# Patient Record
Sex: MALE | Race: WHITE | NOT HISPANIC OR LATINO | Employment: FULL TIME | URBAN - METROPOLITAN AREA
[De-identification: names, ages, dates, MRNs, and addresses within clinical notes are randomized per-mention and may not be internally consistent; named-entity substitution may affect disease eponyms.]

---

## 2018-08-16 DIAGNOSIS — R90.89 ABNORMAL CT OF BRAIN: Primary | ICD-10-CM

## 2018-08-24 ENCOUNTER — HOSPITAL ENCOUNTER (OUTPATIENT)
Dept: RADIOLOGY | Facility: HOSPITAL | Age: 48
Discharge: HOME | End: 2018-08-24
Attending: INTERNAL MEDICINE
Payer: COMMERCIAL

## 2018-08-24 DIAGNOSIS — R90.89 ABNORMAL CT OF BRAIN: ICD-10-CM

## 2018-08-24 PROCEDURE — 70544 MR ANGIOGRAPHY HEAD W/O DYE: CPT | Mod: TC

## 2018-08-24 PROCEDURE — 70544 MR ANGIOGRAPHY HEAD W/O DYE: CPT | Mod: 26,,, | Performed by: RADIOLOGY

## 2018-12-12 ENCOUNTER — TELEPHONE (OUTPATIENT)
Dept: NEUROLOGY | Facility: CLINIC | Age: 48
End: 2018-12-12

## 2018-12-12 NOTE — TELEPHONE ENCOUNTER
----- Message from Hakan Mckeon sent at 12/12/2018  1:51 PM CST -----  Type:  Patient Returning Call    Who Called:  Patient  Who Left Message for Patient:  Ana  Does the patient know what this is regarding?:  An appointment  Best Call Back Number: [phone] (home)

## 2018-12-12 NOTE — TELEPHONE ENCOUNTER
Received medical records and an imaging disc.  Will have imaging uploaded and send the medical records to scanning.  The patient needs an appt with a vascular neurologist at [facility].  Please call him to schedule the appointment at [phone].

## 2018-12-12 NOTE — TELEPHONE ENCOUNTER
----- Message from Matthew Ahuja sent at 12/12/2018 12:52 PM CST -----  Contact: same  Patient called in and stated he missed a call back from Ana in Dr. Reynoso's office about seeing if he can be seen before his scheduled neurology appt at [facility] in April 2019?  Patient call back number is [phone]

## 2018-12-12 NOTE — TELEPHONE ENCOUNTER
Patient is concerned that he was given an appt in April of 2019.  Can the patient wait that long for an appointment?

## 2018-12-12 NOTE — TELEPHONE ENCOUNTER
----- Message from Rita Medina sent at 12/12/2018 12:35 PM CST -----  Patient Requesting Sooner Appointment.     Reason for sooner appt.:due to his condition  When is the first available appointment?4/16  Communication Preference:pt @ [phone]  Additional Information:

## 2018-12-17 NOTE — TELEPHONE ENCOUNTER
----- Message from Zachary Triplett sent at 12/17/2018  2:40 PM CST -----  Patient Returning Call from [facility]    Who Left Message for Patient: Deysi  Communication Preference: [phone]  Additional Information:

## 2018-12-17 NOTE — TELEPHONE ENCOUNTER
----- Message from Rita Medina sent at 12/17/2018  4:15 PM CST -----  Patient Returning Call from [facility]    Who Left Message for Patient:Deysi  Communication Preference:pt@ [phone]  Additional Information:

## 2019-01-31 ENCOUNTER — OFFICE VISIT (OUTPATIENT)
Dept: NEUROLOGY | Facility: CLINIC | Age: 49
End: 2019-01-31
Payer: COMMERCIAL

## 2019-01-31 VITALS
HEART RATE: 74 BPM | HEIGHT: 67 IN | WEIGHT: 158.75 LBS | BODY MASS INDEX: 24.92 KG/M2 | SYSTOLIC BLOOD PRESSURE: 126 MMHG | DIASTOLIC BLOOD PRESSURE: 85 MMHG

## 2019-01-31 DIAGNOSIS — G44.53 HEADACHE, PRIMARY THUNDERCLAP: ICD-10-CM

## 2019-01-31 DIAGNOSIS — M79.18 CERVICAL MYOFASCIAL PAIN SYNDROME: ICD-10-CM

## 2019-01-31 DIAGNOSIS — R20.0 LEFT UPPER EXTREMITY NUMBNESS: ICD-10-CM

## 2019-01-31 DIAGNOSIS — Z86.73 HISTORY OF ISCHEMIC VERTEBROBASILAR ARTERY CEREBELLAR STROKE: Primary | ICD-10-CM

## 2019-01-31 PROCEDURE — 99999 PR PBB SHADOW E&M-EST. PATIENT-LVL III: ICD-10-PCS | Mod: PBBFAC,,, | Performed by: PSYCHIATRY & NEUROLOGY

## 2019-01-31 PROCEDURE — 99245 OFF/OP CONSLTJ NEW/EST HI 55: CPT | Mod: S$GLB,,, | Performed by: PSYCHIATRY & NEUROLOGY

## 2019-01-31 PROCEDURE — 99245 PR OFFICE CONSULTATION,LEVEL V: ICD-10-PCS | Mod: S$GLB,,, | Performed by: PSYCHIATRY & NEUROLOGY

## 2019-01-31 PROCEDURE — 99999 PR PBB SHADOW E&M-EST. PATIENT-LVL III: CPT | Mod: PBBFAC,,, | Performed by: PSYCHIATRY & NEUROLOGY

## 2019-01-31 RX ORDER — METOPROLOL SUCCINATE 100 MG/1
TABLET, EXTENDED RELEASE ORAL
Refills: 2 | COMMUNITY
Start: 2018-11-10

## 2019-01-31 RX ORDER — HYDROCHLOROTHIAZIDE 25 MG/1
TABLET ORAL
Refills: 2 | COMMUNITY
Start: 2018-11-30

## 2019-01-31 RX ORDER — NIFEDIPINE 30 MG/1
TABLET, EXTENDED RELEASE ORAL
Refills: 12 | COMMUNITY
Start: 2019-01-25

## 2019-01-31 RX ORDER — OMEPRAZOLE 20 MG/1
CAPSULE, DELAYED RELEASE ORAL
Refills: 12 | COMMUNITY
Start: 2019-01-25

## 2019-01-31 RX ORDER — ROSUVASTATIN CALCIUM 10 MG/1
TABLET, COATED ORAL
Refills: 2 | COMMUNITY
Start: 2018-12-13

## 2019-01-31 RX ORDER — ASPIRIN 81 MG/1
81 TABLET ORAL DAILY
COMMUNITY

## 2019-01-31 NOTE — PROGRESS NOTES
Outpatient Neurology Consultation    Requesting physician:  Dr. Harris    Impression:  1. 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 Recent L C-O HA;  Suspect cervicogenic but will repeat MRI given location of prior infarct with hemorrhagic transformation  3. Chronic, intermittent LUE numbness, suspect peripheral; r/o radic (vs CTS)  4. Cervical myofascial pain  5. History of Bell's Palsy    Stroke risk factors: prior stroke, HTN, dyslipidemia    Plan:  1. MRI brain  2. Exercise encouraged  3. Smoking cessation discussed  4. Continue ASA, Crestor  5. Home cervical exercises; Cervical PT prn  6. EMG/NCV LUE  7. RTC 3 months or sooner, prn      Problem List Items Addressed This Visit        1 - High    History of ischemic vertebrobasilar artery cerebellar stroke - Primary    Overview     L PICA, ESUS         Relevant Orders    MRI Brain Without Contrast       2     Left upper extremity numbness    Relevant Orders    EMG W/ ULTRASOUND AND NERVE CONDUCTION TEST 1 Extremity       3     Cervical myofascial pain syndrome      Other Visit Diagnoses     Headache, primary thunderclap        Relevant Orders    MRI Brain Without Contrast          CC:  Stroke and HA    HPI:  49 y/o M referred by Dr. Harris for remote stroke and recent headache.  Admitted Aug '17 for ischemic stroke.  He had MRI in Jan '18 for vertigo.  MRI shows mixed (acute on subacute) signal ischemic stroke with hemorrhagic transformation.  F/U MRI Aug '18 showed L PICA infarct.  MRA brain neg.   Outside CTA neck/brain nl per report  Echo: nl  Thrombophilia eval: neg    He reports severe HAs last week (L occipital) that have resolved.  Pounding but no nausea, photo/phonophobia.  There was associated neck pain/stiffness; no fever.     Past Medical History:   Diagnosis Date    Dyslipidemia     HTN (hypertension)     Stroke       Past Surgical History:   Procedure Laterality Date    VASECTOMY        Outpatient Medications Marked  as Taking for the 1/31/19 encounter (Office Visit) with Anival Doyle MD   Medication Sig Dispense Refill    aspirin (ECOTRIN) 81 MG EC tablet Take 81 mg by mouth once daily.      hydroCHLOROthiazide (HYDRODIURIL) 25 MG tablet TK 1 T PO ONCE QAM FOR BP.  2    metoprolol succinate (TOPROL-XL) 100 MG 24 hr tablet   2    NIFEdipine (PROCARDIA-XL) 30 MG (OSM) 24 hr tablet TK 1 T PO QD ATN  12    omeprazole (PRILOSEC) 20 MG capsule TK 1 C PO QD PRN  12    rosuvastatin (CRESTOR) 10 MG tablet TAKE 1 TABLET BY MOUTH ONCE IN THE EVENING FOR CHOLESTEROL  2      Review of patient's allergies indicates:  Allergies not on file   History reviewed. No pertinent family history.   Social History     Socioeconomic History    Marital status: Single     Spouse name: Not on file    Number of children: Not on file    Years of education: Not on file    Highest education level: Not on file   Social Needs    Financial resource strain: Not on file    Food insecurity - worry: Not on file    Food insecurity - inability: Not on file    Transportation needs - medical: Not on file    Transportation needs - non-medical: Not on file   Occupational History    Not on file   Tobacco Use    Smoking status: Heavy Tobacco Smoker     Packs/day: 0.50     Types: Cigarettes    Smokeless tobacco: Current User   Substance and Sexual Activity    Alcohol use: Yes     Comment: barely drinks    Drug use: Yes     Types: Marijuana     Comment: once in a while    Sexual activity: Not on file   Other Topics Concern    Not on file   Social History Narrative    Not on file     Review Of Systems:  General: Negative for fever   HENT: Negative for tinnitus, nose bleeds, neck stiffness   Cardiac Negative for palpitations   Vascular: Negative for easy bruising   Pulmonary: Negative for cough   Gastrointestinal: Negative for constipation   Urinary: Negative for incomplete bladder emptying   Musculoskeletal: Negative for muscle aches  "  Neurological: As above. Otherwise negative for abnormal movements   Psychiatric:  Negative for depression       /85   Pulse 74   Ht 5' 7" (1.702 m)   Wt 72 kg (158 lb 11.7 oz)   BMI 24.86 kg/m²    Well developed, well nourished male  Extremities: no edema; neg Tinel's/Phalan's  Neck: L cervical paraspinal spasm/pain with decreased R lateral flexion.    Mental status:   Awake, alert and appropriately oriented   Normal recent and remote memory   Normal attention and concentration   Normal speech and language   Normal fund of knowledge   No extinction  Cranial nerves:   EOMF without nystagmus   VFF   Normal facial sensation   Flat R NLF   Intact hearing bilaterally   Palate elevates symmetrically   Normal SCM and trapezius strength   Tongue midline  Motor:   No pronator drift   Normal FF movements bilaterally   Normal muscle tone, bulk and power   No abnormal movements  Sensory   Intact to LT, temperature  DTRs   2+ and symmetric except trace AJs   Plantar responses NT  Coordination   Intact to FNF and HTS  Gait   Normal base and gait    Data Reviewed:  Prior MRIs, MRA brain  Outside records    Anival Doyle MD  "

## 2019-02-07 ENCOUNTER — TELEPHONE (OUTPATIENT)
Dept: NEUROLOGY | Facility: CLINIC | Age: 49
End: 2019-02-07

## 2019-02-07 ENCOUNTER — HOSPITAL ENCOUNTER (OUTPATIENT)
Dept: RADIOLOGY | Facility: HOSPITAL | Age: 49
Discharge: HOME | End: 2019-02-07
Attending: PSYCHIATRY & NEUROLOGY
Payer: COMMERCIAL

## 2019-02-07 DIAGNOSIS — Z86.73 HISTORY OF ISCHEMIC VERTEBROBASILAR ARTERY CEREBELLAR STROKE: ICD-10-CM

## 2019-02-07 DIAGNOSIS — G44.53 HEADACHE, PRIMARY THUNDERCLAP: ICD-10-CM

## 2019-02-07 PROCEDURE — 70551 MRI BRAIN STEM W/O DYE: CPT | Mod: TC

## 2019-02-07 PROCEDURE — 70551 MRI BRAIN STEM W/O DYE: CPT | Mod: 26,,, | Performed by: RADIOLOGY

## 2019-02-07 PROCEDURE — 70551 MRI BRAIN WITHOUT CONTRAST: ICD-10-PCS | Mod: 26,,, | Performed by: RADIOLOGY

## 2019-02-08 NOTE — TELEPHONE ENCOUNTER
Deysi,  Pls let him know the MRI shows nothing new (just the old stroke).      Also, ask if he would like to sign up for [facility].  Thanks,  RANDRE

## 2019-02-11 ENCOUNTER — TELEPHONE (OUTPATIENT)
Dept: NEUROLOGY | Facility: CLINIC | Age: 49
End: 2019-02-11

## 2019-02-11 NOTE — TELEPHONE ENCOUNTER
----- Message from Ashwin Killian sent at 2/11/2019  3:05 PM CST -----  Contact: Patient @ [phone]  Patient returning a missed call from luna Jo return call

## 2019-02-11 NOTE — TELEPHONE ENCOUNTER
Left message on Mr. Mendoza's voicemail requesting a call back.   --------------------------------------------------------------------------    Progress Notes     Anival Doyle MD at 2/7/2019  8:29 PM     Status: Signed      Deysi,  Pls let him know the MRI shows nothing new (just the old stroke).       Also, ask if he would like to sign up for [facility].  Thanks,  PIERRE          [id number]  Expires: 2/15/2019 4:59 AM

## 2019-03-19 ENCOUNTER — PROCEDURE VISIT (OUTPATIENT)
Dept: NEUROLOGY | Facility: CLINIC | Age: 49
End: 2019-03-19
Payer: COMMERCIAL

## 2019-03-19 DIAGNOSIS — R20.0 LEFT UPPER EXTREMITY NUMBNESS: ICD-10-CM

## 2019-03-19 PROCEDURE — 95910 PR NERVE CONDUCTION STUDY; 7-8 STUDIES: ICD-10-PCS | Mod: S$GLB,,, | Performed by: NEUROMUSCULOSKELETAL MEDICINE & OMM

## 2019-03-19 PROCEDURE — 95886 PR EMG COMPLETE, W/ NERVE CONDUCTION STUDIES, 5+ MUSCLES: ICD-10-PCS | Mod: S$GLB,,, | Performed by: NEUROMUSCULOSKELETAL MEDICINE & OMM

## 2019-03-19 PROCEDURE — 95886 MUSC TEST DONE W/N TEST COMP: CPT | Mod: S$GLB,,, | Performed by: NEUROMUSCULOSKELETAL MEDICINE & OMM

## 2019-03-19 PROCEDURE — 95910 NRV CNDJ TEST 7-8 STUDIES: CPT | Mod: S$GLB,,, | Performed by: NEUROMUSCULOSKELETAL MEDICINE & OMM

## 2019-03-19 NOTE — PROCEDURES
EMG Needle exam performed by me.  Nerve conduction studies were also performed by me without the assistance of the technicianEMG Needle exam performed by me.  Nerve conduction studies were also performed by me without the assistance of the technician

## 2019-04-04 ENCOUNTER — OFFICE VISIT (OUTPATIENT)
Dept: NEUROLOGY | Facility: CLINIC | Age: 49
End: 2019-04-04
Payer: COMMERCIAL

## 2019-04-04 VITALS
SYSTOLIC BLOOD PRESSURE: 134 MMHG | WEIGHT: 160.25 LBS | BODY MASS INDEX: 25.15 KG/M2 | HEIGHT: 67 IN | HEART RATE: 65 BPM | DIASTOLIC BLOOD PRESSURE: 92 MMHG

## 2019-04-04 DIAGNOSIS — Z86.73 HISTORY OF ISCHEMIC VERTEBROBASILAR ARTERY CEREBELLAR STROKE: ICD-10-CM

## 2019-04-04 DIAGNOSIS — M79.18 CERVICAL MYOFASCIAL PAIN SYNDROME: Primary | ICD-10-CM

## 2019-04-04 DIAGNOSIS — R20.0 LEFT UPPER EXTREMITY NUMBNESS: ICD-10-CM

## 2019-04-04 PROCEDURE — 3008F BODY MASS INDEX DOCD: CPT | Mod: CPTII,S$GLB,, | Performed by: PSYCHIATRY & NEUROLOGY

## 2019-04-04 PROCEDURE — 99214 PR OFFICE/OUTPT VISIT, EST, LEVL IV, 30-39 MIN: ICD-10-PCS | Mod: S$GLB,,, | Performed by: PSYCHIATRY & NEUROLOGY

## 2019-04-04 PROCEDURE — 3008F PR BODY MASS INDEX (BMI) DOCUMENTED: ICD-10-PCS | Mod: CPTII,S$GLB,, | Performed by: PSYCHIATRY & NEUROLOGY

## 2019-04-04 PROCEDURE — 99999 PR PBB SHADOW E&M-EST. PATIENT-LVL III: CPT | Mod: PBBFAC,,, | Performed by: PSYCHIATRY & NEUROLOGY

## 2019-04-04 PROCEDURE — 99214 OFFICE O/P EST MOD 30 MIN: CPT | Mod: S$GLB,,, | Performed by: PSYCHIATRY & NEUROLOGY

## 2019-04-04 PROCEDURE — 99999 PR PBB SHADOW E&M-EST. PATIENT-LVL III: ICD-10-PCS | Mod: PBBFAC,,, | Performed by: PSYCHIATRY & NEUROLOGY

## 2019-04-04 NOTE — PROGRESS NOTES
"Neurology Clinic Follow-Up Note      Impression:  1. 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 Cervicogenic headaches due to cervical myofascial pain  3. Chronic, intermittent LUE numbness, improved: suspect EMG/NCV-negative radic vs CTS  4. History of Bell's Palsy    Stroke risk factors: prior stroke, HTN, dyslipidemia    Plan:  1. Cervical PT order provided  2. Exercise encouraged  3. Low Na+ diet discusssed  4. Smoking cessation discussed again  5. Continue ASA, Crestor  6. RTC 3 months or sooner, prn      Problem List Items Addressed This Visit        1 - High    History of ischemic vertebrobasilar artery cerebellar stroke    Overview     L PICA, ESUS            2     Left upper extremity numbness       3     Cervical myofascial pain syndrome - Primary    Relevant Orders    Ambulatory consult to Physical Therapy        Patient returns for follow-up of above.  No recurrent stroke symptoms.  MRI brain showed remote L cerebellar stroke  EMG/NCV was normal. Has intermittent LUE sensory symptoms but not too bothersome  Headaches and neck pain had improved but recur every few weeks.  Still smoking  Salting food      Past Medical History:   Diagnosis Date    Dyslipidemia     HTN (hypertension)     Stroke          Outpatient Medications Marked as Taking for the 4/4/19 encounter (Office Visit) with Anival Doyle MD   Medication Sig Dispense Refill    aspirin (ECOTRIN) 81 MG EC tablet Take 81 mg by mouth once daily.      hydroCHLOROthiazide (HYDRODIURIL) 25 MG tablet TK 1 T PO ONCE QAM FOR BP.  2    metoprolol succinate (TOPROL-XL) 100 MG 24 hr tablet   2    NIFEdipine (PROCARDIA-XL) 30 MG (OSM) 24 hr tablet TK 1 T PO QD ATN  12    omeprazole (PRILOSEC) 20 MG capsule TK 1 C PO QD PRN  12    rosuvastatin (CRESTOR) 10 MG tablet TAKE 1 TABLET BY MOUTH ONCE IN THE EVENING FOR CHOLESTEROL  2       BP (!) 134/92   Pulse 65   Ht 5' 7" (1.702 m)   Wt 72.7 kg (160 lb 4.4 oz)   " BMI 25.10 kg/m²   Well-developed, well nourished.  Awake, alert and oriented.  Language is normal.  EOMF without nystagmus, VFF, facial movements intact.  No UE drift.  No extinction to double simultaneous tactile stimulation.  Muscle power is normal.  Coordination intact to FNF.  Gait is steady.    Anival Doyle MD

## 2024-04-26 ENCOUNTER — HOSPITAL ENCOUNTER (OUTPATIENT)
Dept: RADIOLOGY | Facility: HOSPITAL | Age: 54
Discharge: HOME | End: 2024-04-26
Attending: INTERNAL MEDICINE
Payer: COMMERCIAL

## 2024-04-26 DIAGNOSIS — R05.2 SUBACUTE COUGH: ICD-10-CM

## 2024-04-26 PROCEDURE — 71046 X-RAY EXAM CHEST 2 VIEWS: CPT | Mod: TC

## 2024-04-27 ENCOUNTER — LAB VISIT (OUTPATIENT)
Dept: LAB | Facility: HOSPITAL | Age: 54
End: 2024-04-27
Attending: INTERNAL MEDICINE
Payer: COMMERCIAL

## 2024-04-27 DIAGNOSIS — Z00.00 ROUTINE MEDICAL EXAM: ICD-10-CM

## 2024-04-27 LAB
25(OH)D3+25(OH)D2 SERPL-MCNC: 36 NG/ML (ref 30–96)
ALBUMIN SERPL BCP-MCNC: 3.8 G/DL (ref 3.5–5.2)
ALP SERPL-CCNC: 103 U/L (ref 55–135)
ALT SERPL W/O P-5'-P-CCNC: 31 U/L (ref 10–44)
ANION GAP SERPL CALC-SCNC: 3 MMOL/L (ref 3–11)
AST SERPL-CCNC: 16 U/L (ref 10–40)
BASOPHILS # BLD AUTO: 0.06 K/UL (ref 0–0.2)
BASOPHILS NFR BLD: 0.6 % (ref 0–1.9)
BILIRUB SERPL-MCNC: 0.3 MG/DL (ref 0.1–1)
BUN SERPL-MCNC: 18 MG/DL (ref 6–20)
CALCIUM SERPL-MCNC: 8.5 MG/DL (ref 8.7–10.5)
CHLORIDE SERPL-SCNC: 106 MMOL/L (ref 95–110)
CHOLEST SERPL-MCNC: 136 MG/DL (ref 120–199)
CHOLEST/HDLC SERPL: 4.4 {RATIO} (ref 2–5)
CO2 SERPL-SCNC: 29 MMOL/L (ref 23–29)
COMPLEXED PSA SERPL-MCNC: 0.91 NG/ML (ref 0–4)
CREAT SERPL-MCNC: 1.1 MG/DL (ref 0.5–1.4)
DIFFERENTIAL METHOD BLD: ABNORMAL
EOSINOPHIL # BLD AUTO: 0.5 K/UL (ref 0–0.5)
EOSINOPHIL NFR BLD: 4.3 % (ref 0–8)
ERYTHROCYTE [DISTWIDTH] IN BLOOD BY AUTOMATED COUNT: 16.5 % (ref 11.5–14.5)
EST. GFR  (NO RACE VARIABLE): >60 ML/MIN/1.73 M^2
ESTIMATED AVG GLUCOSE: 128 MG/DL (ref 68–131)
GLUCOSE SERPL-MCNC: 111 MG/DL (ref 70–110)
HBA1C MFR BLD: 6.1 % (ref 4–5.6)
HCT VFR BLD AUTO: 34.3 % (ref 40–54)
HCV AB SERPL QL IA: NORMAL
HDLC SERPL-MCNC: 31 MG/DL (ref 40–75)
HDLC SERPL: 22.8 % (ref 20–50)
HGB BLD-MCNC: 10 G/DL (ref 14–18)
HIV 1+2 AB+HIV1 P24 AG SERPL QL IA: NORMAL
IMM GRANULOCYTES # BLD AUTO: 0.03 K/UL (ref 0–0.04)
IMM GRANULOCYTES NFR BLD AUTO: 0.3 % (ref 0–0.5)
LDLC SERPL CALC-MCNC: 56.6 MG/DL (ref 63–159)
LYMPHOCYTES # BLD AUTO: 2.4 K/UL (ref 1–4.8)
LYMPHOCYTES NFR BLD: 22.8 % (ref 18–48)
MCH RBC QN AUTO: 20.2 PG (ref 27–31)
MCHC RBC AUTO-ENTMCNC: 29.2 G/DL (ref 32–36)
MCV RBC AUTO: 69 FL (ref 82–98)
MONOCYTES # BLD AUTO: 0.9 K/UL (ref 0.3–1)
MONOCYTES NFR BLD: 8.6 % (ref 4–15)
NEUTROPHILS # BLD AUTO: 6.7 K/UL (ref 1.8–7.7)
NEUTROPHILS NFR BLD: 63.4 % (ref 38–73)
NONHDLC SERPL-MCNC: 105 MG/DL
NRBC BLD-RTO: 0 /100 WBC
PLATELET # BLD AUTO: 368 K/UL (ref 150–450)
PMV BLD AUTO: 8.6 FL (ref 9.2–12.9)
POTASSIUM SERPL-SCNC: 4.1 MMOL/L (ref 3.5–5.1)
PROT SERPL-MCNC: 7.6 G/DL (ref 6–8.4)
RBC # BLD AUTO: 4.94 M/UL (ref 4.6–6.2)
SODIUM SERPL-SCNC: 138 MMOL/L (ref 136–145)
TRIGL SERPL-MCNC: 242 MG/DL (ref 30–150)
TSH SERPL DL<=0.005 MIU/L-ACNC: 2.51 UIU/ML (ref 0.4–4)
WBC # BLD AUTO: 10.56 K/UL (ref 3.9–12.7)

## 2024-04-27 PROCEDURE — 80061 LIPID PANEL: CPT | Performed by: INTERNAL MEDICINE

## 2024-04-27 PROCEDURE — 83036 HEMOGLOBIN GLYCOSYLATED A1C: CPT | Performed by: INTERNAL MEDICINE

## 2024-04-27 PROCEDURE — 84443 ASSAY THYROID STIM HORMONE: CPT | Performed by: INTERNAL MEDICINE

## 2024-04-27 PROCEDURE — 84153 ASSAY OF PSA TOTAL: CPT | Performed by: INTERNAL MEDICINE

## 2024-04-27 PROCEDURE — 36415 COLL VENOUS BLD VENIPUNCTURE: CPT | Performed by: INTERNAL MEDICINE

## 2024-04-27 PROCEDURE — 85025 COMPLETE CBC W/AUTO DIFF WBC: CPT | Performed by: INTERNAL MEDICINE

## 2024-04-27 PROCEDURE — 82306 VITAMIN D 25 HYDROXY: CPT | Performed by: INTERNAL MEDICINE

## 2024-04-27 PROCEDURE — 86803 HEPATITIS C AB TEST: CPT | Performed by: INTERNAL MEDICINE

## 2024-04-27 PROCEDURE — 87389 HIV-1 AG W/HIV-1&-2 AB AG IA: CPT | Performed by: INTERNAL MEDICINE

## 2024-04-27 PROCEDURE — 80053 COMPREHEN METABOLIC PANEL: CPT | Performed by: INTERNAL MEDICINE

## 2024-05-02 ENCOUNTER — LAB VISIT (OUTPATIENT)
Dept: LAB | Facility: HOSPITAL | Age: 54
End: 2024-05-02
Attending: INTERNAL MEDICINE
Payer: COMMERCIAL

## 2024-05-02 DIAGNOSIS — Z00.00 ROUTINE MEDICAL EXAM: ICD-10-CM

## 2024-05-02 LAB
OB PNL STL: POSITIVE
OB PNL STL: POSITIVE

## 2024-05-02 PROCEDURE — 82272 OCCULT BLD FECES 1-3 TESTS: CPT | Mod: 91 | Performed by: INTERNAL MEDICINE

## 2024-05-02 NOTE — PROGRESS NOTES
Patient stool is positive for blood, make sure he is getting referred to GI or General Surg for evaluation and endoscopy.

## 2024-05-17 PROBLEM — D50.0 IRON DEFICIENCY ANEMIA DUE TO CHRONIC BLOOD LOSS: Status: ACTIVE | Noted: 2024-05-17

## 2024-05-17 PROBLEM — J30.9 ALLERGIC RHINITIS: Status: ACTIVE | Noted: 2024-05-17

## 2024-05-17 PROBLEM — R19.5 HEME + STOOL: Status: ACTIVE | Noted: 2024-05-17

## 2024-07-23 ENCOUNTER — TELEPHONE (OUTPATIENT)
Dept: SURGERY | Facility: CLINIC | Age: 54
End: 2024-07-23
Payer: COMMERCIAL

## 2024-07-23 DIAGNOSIS — Z86.010 HX OF COLONIC POLYPS: Primary | ICD-10-CM

## 2024-07-23 NOTE — TELEPHONE ENCOUNTER
RN called pt to let him know that Dr. Koch reviewed his chart and would like to get him set up for a c-scope at [facility].  RN messaged Nurse Lynn and placed case request.  RN left pt a voicemail with our contact information.

## 2024-08-06 DIAGNOSIS — Z01.818 PREOP TESTING: Primary | ICD-10-CM

## 2024-08-06 RX ORDER — SODIUM PICOSULFATE, MAGNESIUM OXIDE, AND ANHYDROUS CITRIC ACID 12; 3.5; 1 G/175ML; G/175ML; MG/175ML
2 LIQUID ORAL ONCE
Qty: 350 ML | Refills: 0 | Status: SHIPPED | OUTPATIENT
Start: 2024-08-14 | End: 2024-08-14

## 2024-08-06 RX ORDER — BISACODYL 5 MG
20 TABLET, DELAYED RELEASE (ENTERIC COATED) ORAL ONCE
Qty: 4 TABLET | Refills: 0 | Status: SHIPPED | OUTPATIENT
Start: 2024-08-14 | End: 2024-08-07 | Stop reason: CLARIF

## 2024-08-07 ENCOUNTER — HOSPITAL ENCOUNTER (OUTPATIENT)
Dept: PREADMISSION TESTING | Facility: HOSPITAL | Age: 54
Discharge: HOME | End: 2024-08-07
Attending: STUDENT IN AN ORGANIZED HEALTH CARE EDUCATION/TRAINING PROGRAM
Payer: COMMERCIAL

## 2024-08-15 ENCOUNTER — ANESTHESIA (OUTPATIENT)
Dept: ENDOSCOPY | Facility: HOSPITAL | Age: 54
End: 2024-08-15
Payer: COMMERCIAL

## 2024-08-15 ENCOUNTER — HOSPITAL ENCOUNTER (OUTPATIENT)
Facility: HOSPITAL | Age: 54
Discharge: HOME | End: 2024-08-15
Attending: STUDENT IN AN ORGANIZED HEALTH CARE EDUCATION/TRAINING PROGRAM | Admitting: STUDENT IN AN ORGANIZED HEALTH CARE EDUCATION/TRAINING PROGRAM
Payer: COMMERCIAL

## 2024-08-15 ENCOUNTER — ANESTHESIA EVENT (OUTPATIENT)
Dept: ENDOSCOPY | Facility: HOSPITAL | Age: 54
End: 2024-08-15
Payer: COMMERCIAL

## 2024-08-15 VITALS
TEMPERATURE: 97 F | OXYGEN SATURATION: 99 % | SYSTOLIC BLOOD PRESSURE: 157 MMHG | RESPIRATION RATE: 15 BRPM | DIASTOLIC BLOOD PRESSURE: 90 MMHG | HEART RATE: 60 BPM

## 2024-08-15 DIAGNOSIS — D12.5 ADENOMATOUS POLYP OF SIGMOID COLON: Primary | ICD-10-CM

## 2024-08-15 PROCEDURE — 37000008 HC ANESTHESIA 1ST 15 MINUTES: Performed by: STUDENT IN AN ORGANIZED HEALTH CARE EDUCATION/TRAINING PROGRAM

## 2024-08-15 PROCEDURE — 63600175 PHARM REV CODE 636 W HCPCS: Performed by: NURSE ANESTHETIST, CERTIFIED REGISTERED

## 2024-08-15 PROCEDURE — 25000003 PHARM REV CODE 250: Performed by: NURSE ANESTHETIST, CERTIFIED REGISTERED

## 2024-08-15 PROCEDURE — 45385 COLONOSCOPY W/LESION REMOVAL: CPT | Mod: ,,, | Performed by: STUDENT IN AN ORGANIZED HEALTH CARE EDUCATION/TRAINING PROGRAM

## 2024-08-15 PROCEDURE — 45385 COLONOSCOPY W/LESION REMOVAL: CPT | Performed by: STUDENT IN AN ORGANIZED HEALTH CARE EDUCATION/TRAINING PROGRAM

## 2024-08-15 PROCEDURE — 27201089 HC SNARE, DISP (ANY): Performed by: STUDENT IN AN ORGANIZED HEALTH CARE EDUCATION/TRAINING PROGRAM

## 2024-08-15 PROCEDURE — 37000009 HC ANESTHESIA EA ADD 15 MINS: Performed by: STUDENT IN AN ORGANIZED HEALTH CARE EDUCATION/TRAINING PROGRAM

## 2024-08-15 RX ORDER — LIDOCAINE HYDROCHLORIDE 20 MG/ML
INJECTION, SOLUTION EPIDURAL; INFILTRATION; INTRACAUDAL; PERINEURAL
Status: DISCONTINUED | OUTPATIENT
Start: 2024-08-15 | End: 2024-08-15

## 2024-08-15 RX ORDER — PROPOFOL 10 MG/ML
VIAL (ML) INTRAVENOUS
Status: DISCONTINUED | OUTPATIENT
Start: 2024-08-15 | End: 2024-08-15

## 2024-08-15 RX ADMIN — LIDOCAINE HYDROCHLORIDE 5 ML: 20 INJECTION, SOLUTION EPIDURAL; INFILTRATION; INTRACAUDAL; PERINEURAL at 10:08

## 2024-08-15 RX ADMIN — PROPOFOL 50 MG: 10 INJECTION, EMULSION INTRAVENOUS at 11:08

## 2024-08-15 RX ADMIN — PROPOFOL 50 MG: 10 INJECTION, EMULSION INTRAVENOUS at 10:08

## 2024-08-15 RX ADMIN — PROPOFOL 100 MG: 10 INJECTION, EMULSION INTRAVENOUS at 10:08

## 2024-08-15 NOTE — H&P
[facility]  Colon and Rectal Surgery    [address]  [place], [place]  Tel: [phone]  Fax: [phone]  [email or web address]   MD Myron Norris MD Brian Kann, MD W. Forrest Johnston, MD Matthew Giglia, MD Jennifer Paruch, MD William Kethman, MD Danielle Kay, MD     Patient name: Reji Doss   YOB: 1970   MRN: [id number]  Date of procedure: 08/15/2024    Procedure: Colonoscopy  Indications: Recent colonoscopy with unresected sigmoid colon polyp    The patient was informed of the availability of a certified  without charge. A certified  was not necessary for this visit.    Sedation plan: MAC  ASA: ASA 2 - Patient with mild systemic disease with no functional limitations    Review of Systems  See above    Past Medical History:   Diagnosis Date    Dyslipidemia     GERD (gastroesophageal reflux disease)     HTN (hypertension)     Hyperlipidemia     Stroke      Past Surgical History:   Procedure Laterality Date    COLONOSCOPY  07/03/2024    VASECTOMY       Family History   Problem Relation Name Age of Onset    Hypertension Mother       Social History     Tobacco Use    Smoking status: Former     Current packs/day: 0.25     Average packs/day: 0.3 packs/day for 15.0 years (3.8 ttl pk-yrs)     Types: Cigarettes, Vaping with nicotine    Smokeless tobacco: Current    Tobacco comments:     Former cigarette smoker; currently vapes with nicotine   Substance Use Topics    Alcohol use: Never     Comment: barely drinks    Drug use: Yes     Types: Marijuana     Comment: once in a while     Review of patient's allergies indicates:  No Known Allergies    Prior to Admission medications    Medication Sig Start Date End Date Taking? Authorizing Provider   ascorbic acid, vitamin C, (VITAMIN C) 500 MG tablet Take 1 tablet (500 mg total) by mouth once daily. 5/3/24   Vinay Brennan III, MD   aspirin (ECOTRIN) 81 MG EC  tablet Take 81 mg by mouth once daily.    Provider, Historical   cefdinir (OMNICEF) 300 MG capsule Take 300 mg by mouth 2 (two) times daily. 5/16/24   Provider, Historical   cetirizine (ZYRTEC) 10 MG tablet Take 10 mg by mouth. 5/2/24   Provider, Historical   ferrous sulfate (FEOSOL) 325 mg (65 mg iron) Tab tablet TAKE 1 TABLET BY MOUTH ONCE DAILY. 5/13/24   Vinay Brennan III, MD   fluticasone propionate (FLONASE) 50 mcg/actuation nasal spray 2 sprays by Each Nostril route. 5/2/24   Provider, Historical   hydroCHLOROthiazide (HYDRODIURIL) 25 MG tablet TK 1 T PO ONCE QAM FOR BP. 11/30/18   Provider, Historical   methylPREDNISolone (MEDROL DOSEPACK) 4 mg tablet Take by mouth. 5/16/24   Provider, Historical   metoprolol succinate (TOPROL-XL) 100 MG 24 hr tablet  11/10/18   Provider, Historical   montelukast (SINGULAIR) 10 mg tablet Take 10 mg by mouth. 5/2/24   Provider, Historical   NIFEdipine (PROCARDIA-XL) 30 MG (OSM) 24 hr tablet TK 1 T PO QD ATN 1/25/19   Provider, Historical   omeprazole (PRILOSEC) 20 MG capsule TK 1 C PO QD PRN 1/25/19   Provider, Historical   rosuvastatin (CRESTOR) 10 MG tablet TAKE 1 TABLET BY MOUTH ONCE IN THE EVENING FOR CHOLESTEROL 12/13/18   Provider, Historical   sod picosulf-mag ox-citric ac (CLENPIQ) 10 mg-3.5 gram- 12 gram/175 mL Soln Take 2 Bottles by mouth once. for 1 dose 8/14/24 8/14/24  Nilson Koch MD       Physical Examination  /84 (BP Location: Left arm, Patient Position: Lying)   Pulse 61   Temp 97.4 °F (36.3 °C) (Skin)   Resp 18   SpO2 99%      Constitutional: well developed, no cough, no dyspnea, alert, and no acute distress    Head: Normocephalic, no lesions, without obvious abnormality  Eye: Normal external eye, conjunctiva, and lids, PERRL  Cardiovascular: regular rate and regular rhythm  Respiratory: normal air entry  Gastrointestinal: soft, non-tender, without masses or organomegaly  Neurologic: alert, oriented, normal speech, no focal findings or  movement disorder noted  Psychiatric: appropriate, normal mood    Plan of Care    It was a pleasure meeting Mr. Doss today - we will plan to perform a colonoscopy with monitored anesthesia care. The details of the procedure, the possible need for biopsy or polypectomy and the potential risks including bleeding, perforation, missed polyps were discussed in detail and they consented to undergo the procedure.      Nilson Koch MD, FACS, FASCRS  Department of Colon & Rectal Surgery  [facility]

## 2024-08-15 NOTE — DISCHARGE SUMMARY
Brief Operative Note    Date of surgery: 08/15/2024    Pre-operative Diagnosis:  Hx of colonic polyps [Z86.010]     Post-operative Diagnosis:   Same as above    Surgeon: Nilson Koch M.D.    Procedure:  Colonoscopy     Anesthesia: Monitored anesthesia care     Findings:  See operative note    Estimated blood loss: Minimal         Specimens:   See operative note    Discharge Note    Outcome:   Patient tolerated treatment/procedure well without complication and is now ready for discharge.    Disposition:   Home or Self Care    Final diagnosis:    Hx of colonic polyps [Z86.010]    Follow-up:   With primary care provider    Discharge Procedure Orders   Diet Adult Regular     No dressing needed     Notify your health care provider if you experience any of the following:  temperature >100.4     Notify your health care provider if you experience any of the following:  persistent nausea and vomiting or diarrhea     Notify your health care provider if you experience any of the following:  severe uncontrolled pain     Notify your health care provider if you experience any of the following:  redness, tenderness, or signs of infection (pain, swelling, redness, odor or green/yellow discharge around incision site)     Notify your health care provider if you experience any of the following:  difficulty breathing or increased cough     Notify your health care provider if you experience any of the following:  severe persistent headache     Notify your health care provider if you experience any of the following:  worsening rash     Notify your health care provider if you experience any of the following:  persistent dizziness, light-headedness, or visual disturbances     Notify your health care provider if you experience any of the following:  increased confusion or weakness     Activity as tolerated       [id number]     Nilson Koch MD

## 2024-08-15 NOTE — OP NOTE
[facility]  Colon and Rectal Surgery    [address]  [place], [place]  Tel: [phone]  Fax: [phone]  [email or web address]   MD Myron Norris MD Brian Kann, MD W. Forrest Johnston, MD Matthew Giglia, MD Jennifer Paruch, MD William Kethman, MD Danielle Kay, MD     Patient name: Reji Doss   YOB: 1970   MRN: [id number]  Date of procedure: 08/15/2024  Referring Physician: Vinay Brennan III, MD    Colonoscopy Report    Indication: Recent colonoscopy with unresected sigmoid colon polyp     Procedure: Colonoscopy with removal of polyps or other lesions by snare technique    Findings:  16 mm pedunculated actively bleeding polyp with significant stalk - hot snare polypectomy performed, immediate clip to base placed, removed with Deluca net, tattoo proximal and distal to this lesion  Sigmoid diverticular disease  Approximate withdrawal time: 15 min    Surgeon: Nilson Koch MD    Procedure:  After pre-operative assessment and review of informed consent, the patient was taken to the procedure room and received monitored anesthesia care. The patient was placed in left lateral decubitus position. A timeout was performed according to [facility] Quality and Safety guidelines.      A detailed perianal and digital rectal exam was performed and the endoscope scope was passed under direct vision from the anus to the Cecum, identified by the ileocecal valve and appendiceal orifice. The endoscope was then slowly withdrawn and the mucosa carefully examined. Throughout the procedure, the patient's blood pressure, pulse, and oxygen saturations were monitored continuously. The colonoscopy was performed without difficulty. The patient tolerated the procedure well. The quality of the bowel preparation was good . Please see findings as discussed above.     Complications: None  Estimated blood loss: Minimal  Disposition: PACU and then  Home      Recommendations:  Repeat exam in 1 year, will call with results to determine necessary next steps  High fiber diet      Nilson Koch MD, FACS, FASCRS  Department of Colon & Rectal Surgery  [facility]

## 2024-08-15 NOTE — ANESTHESIA PREPROCEDURE EVALUATION
08/15/2024  Reji Doss is a 53 y.o., male.      Pre-op Assessment    I have reviewed the Patient Summary Reports.     I have reviewed the Nursing Notes. I have reviewed the NPO Status.   I have reviewed the Medications.     Review of Systems  Anesthesia Hx:  No problems with previous Anesthesia                Social:  No Alcohol Use, Former Smoker       Hematology/Oncology:  Hematology Normal   Oncology Normal                                   EENT/Dental:  EENT/Dental Normal           Cardiovascular:  Exercise tolerance: good   Hypertension, well controlled              ECG has been reviewed.                          Pulmonary:  Pulmonary Normal                       Renal/:  Renal/ Normal                 Hepatic/GI:     GERD             Musculoskeletal:  Musculoskeletal Normal                Neurological:   CVA Neuromuscular Disease,                                   Endocrine:  Endocrine Normal            Dermatological:  Skin Normal    Psych:  Psychiatric Normal                  Physical Exam  General: Well nourished    Airway:  Mallampati: II   Mouth Opening: Normal  TM Distance: Normal  Tongue: Normal  Neck ROM: Normal ROM    Chest/Lungs:  Clear to auscultation    Heart:  Rate: Normal  Rhythm: Regular Rhythm  Sounds: Normal      Anesthesia Plan  Type of Anesthesia, risks & benefits discussed:    Anesthesia Type: MAC  Intra-op Monitoring Plan: Standard ASA Monitors  Post Op Pain Control Plan: multimodal analgesia  Induction:  IV  Airway Plan: Direct  Informed Consent: Informed consent signed with the Patient and all parties understand the risks and agree with anesthesia plan.  All questions answered.   ASA Score: 3  Day of Surgery Review of History & Physical: H&P Update referred to the surgeon/provider.I have interviewed and examined the patient. I have reviewed the patient's H&P dated:  8/15/24. There are no significant changes.     Ready For Surgery From Anesthesia Perspective.     .

## 2024-08-15 NOTE — TRANSFER OF CARE
Anesthesia Transfer of Care Note    Patient: Reji Doss    Procedure(s) Performed: Procedure(s) (LRB):  COLONOSCOPY (N/A)    Patient location: PACU    Anesthesia Type: general    Transport from OR: Transported from OR on 6-10 L/min O2 by face mask with adequate spontaneous ventilation    Post pain: adequate analgesia    Post assessment: no apparent anesthetic complications and tolerated procedure well    Post vital signs: stable    Level of consciousness: sedated    Nausea/Vomiting: no nausea/vomiting    Complications: none    Transfer of care protocol was followed      Last vitals: Visit Vitals  BP (!) 105/59   Pulse 66   Temp 36.3 °C (97.4 °F) (Skin)   Resp 15   SpO2 97%

## 2024-08-15 NOTE — ANESTHESIA POSTPROCEDURE EVALUATION
Anesthesia Post Evaluation    Patient: Reji Doss    Procedure(s) Performed: Procedure(s) (LRB):  COLONOSCOPY (N/A)    Final Anesthesia Type: general      Patient location during evaluation: PACU  Patient participation: Yes- Able to Participate  Level of consciousness: awake and alert, oriented and awake  Post-procedure vital signs: reviewed and stable  Pain management: adequate  Airway patency: patent    PONV status at discharge: No PONV  Anesthetic complications: no      Cardiovascular status: blood pressure returned to baseline  Respiratory status: unassisted, spontaneous ventilation and room air  Hydration status: euvolemic  Follow-up not needed.  Comments: [facility]              Vitals Value Taken Time   /90 08/15/24 1147   Temp 98.6 08/15/24 1214   Pulse 60 08/15/24 1147   Resp 15 08/15/24 1147   SpO2 99 % 08/15/24 1147         No case tracking events are documented in the log.      Pain/Adolfo Score: Adolfo Score: 9 (8/15/2024 11:20 AM)

## 2024-08-27 ENCOUNTER — TELEPHONE (OUTPATIENT)
Dept: SURGERY | Facility: CLINIC | Age: 54
End: 2024-08-27
Payer: COMMERCIAL

## 2024-08-27 DIAGNOSIS — C18.9 COLON ADENOCARCINOMA: ICD-10-CM

## 2024-08-27 DIAGNOSIS — C26.9 MALIGNANT NEOPLASM OF ILL-DEFINED SITES WITHIN THE DIGESTIVE SYSTEM: Primary | ICD-10-CM

## 2024-08-27 NOTE — TELEPHONE ENCOUNTER
Spoke with patient, patient scheduled for all appts on same day per request. Reviewed details, instructed on NPO status for CT as well.

## 2024-08-29 ENCOUNTER — TELEPHONE (OUTPATIENT)
Dept: SURGERY | Facility: CLINIC | Age: 54
End: 2024-08-29
Payer: COMMERCIAL

## 2024-08-29 NOTE — TELEPHONE ENCOUNTER
Called pt back to see if he is asking to get his appts moved to [facility].  RN left [facility] for pt to call back.

## 2024-09-16 ENCOUNTER — ANCILLARY ORDERS (OUTPATIENT)
Dept: RADIOLOGY | Facility: HOSPITAL | Age: 54
End: 2024-09-16
Payer: COMMERCIAL

## 2024-09-16 ENCOUNTER — HOSPITAL ENCOUNTER (OUTPATIENT)
Dept: RADIOLOGY | Facility: HOSPITAL | Age: 54
Discharge: HOME | End: 2024-09-16
Attending: STUDENT IN AN ORGANIZED HEALTH CARE EDUCATION/TRAINING PROGRAM
Payer: COMMERCIAL

## 2024-09-16 DIAGNOSIS — C26.9: Primary | ICD-10-CM

## 2024-09-16 DIAGNOSIS — C26.9 MALIGNANT NEOPLASM OF ILL-DEFINED SITES WITHIN THE DIGESTIVE SYSTEM: ICD-10-CM

## 2024-09-16 DIAGNOSIS — I70.0 ATHEROSCLEROTIC ULCER OF AORTA: ICD-10-CM

## 2024-09-16 DIAGNOSIS — C26.9: ICD-10-CM

## 2024-09-16 DIAGNOSIS — Z72.0 TOBACCO USE: ICD-10-CM

## 2024-09-16 DIAGNOSIS — I71.9 ATHEROSCLEROTIC ULCER OF AORTA: ICD-10-CM

## 2024-09-16 PROCEDURE — 74177 CT ABD & PELVIS W/CONTRAST: CPT | Mod: TC

## 2024-09-16 PROCEDURE — 71260 CT THORAX DX C+: CPT | Mod: TC

## 2024-09-16 PROCEDURE — 25500020 PHARM REV CODE 255: Performed by: STUDENT IN AN ORGANIZED HEALTH CARE EDUCATION/TRAINING PROGRAM

## 2024-09-16 RX ADMIN — IOHEXOL 100 ML: 350 INJECTION, SOLUTION INTRAVENOUS at 08:09

## 2024-09-18 NOTE — PROGRESS NOTES
[facility]  Colon and Rectal Surgery    [address]  [place], [place]  Tel: [phone]  Fax: [phone]  [email or web address]   MD Myron Norris MD Brian Kann, MD W. Forrest Johnston, MD Matthew Giglia, MD Jennifer Paruch, MD William Kethman, MD Danielle Kay, MD     Patient name: Reji Doss   YOB: 1970   MRN: [id number]    It was a pleasure seeing Mr. Doss in the Colon and Rectal Surgery clinic here at [facility].     As you know, Mr. Doss is a 53 year old man with a history of HTN, HLD, PVD, and tobacco use  who presents for evaluation of colon adenocarcinoma . He had a pedunculated polyp, excised endoscopically, care discussed at MDT with plan for surveillance.         9/19/2024  Here for surveillance. He is doing well - remains asymptomatic.     Oncology History   Colon adenocarcinoma   8/27/2024 Initial Diagnosis    Colon adenocarcinoma    1. Colon, sigmoid, polyp, polypectomy:   Focal invasive adenocarcinoma, well differentiated   See synoptic report     TUMOR SYNOPTIC: (COLON and RECTUM)   Standard(s): AJCC-UICC 8th Edition     SPECIMEN   Procedure: Polypectomy:     TUMOR   Tumor Site:  Sigmoid   Histologic Type:  Adenocarcinoma   Histologic Grade:  G1, well differentiated   Tumor Extent:  Invades submucosa   Depth of Invasion: < 1 mm (Haggitt level 2)   Lymphovascular Invasion:  Not identified   Perineural Invasion:  Not identified   Tumor Budding Score:  Low (0-4)   Polyp size:  2.1 cm   Polyp Configuration:  Pedunculated with stalk   Number specimen fragments:  Not applicable     MARGINS   All margins negative for invasive carcinoma and dysplasia   Distance from invasive carcinoma to deep/stalk margin: 2 mm     MMR could not be performed due to size of invasive component     9/16/2024 Imaging Significant Findings    CT Chest  No evidence of metastatic disease or other acute process in the chest      9/16/2024  Imaging Significant Findings    CT AP  1. 2 cm left adrenal gland nodule, is nonspecific, most likely/statistically an adenoma.  Follow-up CT upper abdomen without IV contrast may be helpful to further classify  2. Cholelithiasis with mild fundal gallbladder wall thickening, most likely chronic.  Follow-up gallbladder ultrasound as clinically indicated.  3. Two sub 5 mm hepatic hypodensities, are too small to further characterize  4.  Omitted from body of report is description of saccular ectasia of abdominal aorta just distal to origin of inferior mesenteric artery at level of an apparent thrombosed penetrating atheromatous ulcer. The aorta measures up to 2.5 cm at this level. Referred to Vascular Surgery     9/16/2024 Tumor Markers    Patient's tumor was tested for the following markers: CEA.                                              Results of the tumor marker test revealed 3.7       Lab Results   Component Value Date    ALBUMIN 4.0 09/16/2024    CREATININE 1.1 09/16/2024    HGB 16.1 09/16/2024    FERRITIN 35 09/16/2024    TSH 2.510 04/27/2024    CALCIUM 9.0 09/16/2024     Wt Readings from Last 3 Encounters:   09/19/24 79.3 kg (174 lb 13.2 oz)   05/17/24 80.3 kg (177 lb)   04/26/24 84.4 kg (186 lb)     The patient was informed of the availability of a certified  without charge. A certified  was not necessary for this visit.    Review of Systems  See pertinent review of systems above    Past Medical History:   Diagnosis Date    Dyslipidemia     GERD (gastroesophageal reflux disease)     HTN (hypertension)     Hyperlipidemia     Stroke      Past Surgical History:   Procedure Laterality Date    COLONOSCOPY  07/03/2024    COLONOSCOPY N/A 8/15/2024    Procedure: COLONOSCOPY;  Surgeon: Nilson Koch MD;  Location: [facility];  Service: Colon and Rectal;  Laterality: N/A;    VASECTOMY       Family History   Problem Relation Name Age of Onset    Hypertension Mother       Social  History     Tobacco Use    Smoking status: Former     Current packs/day: 0.25     Average packs/day: 0.3 packs/day for 15.0 years (3.8 ttl pk-yrs)     Types: Cigarettes, Vaping with nicotine    Smokeless tobacco: Current    Tobacco comments:     Former cigarette smoker; currently vapes with nicotine   Substance Use Topics    Alcohol use: Never     Comment: barely drinks    Drug use: Yes     Types: Marijuana     Comment: once in a while     Review of patient's allergies indicates:  No Known Allergies    Current Outpatient Medications on File Prior to Visit   Medication Sig Dispense Refill    ascorbic acid, vitamin C, (VITAMIN C) 500 MG tablet Take 1 tablet (500 mg total) by mouth once daily. 30 tablet 3    aspirin (ECOTRIN) 81 MG EC tablet Take 81 mg by mouth once daily.      ferrous sulfate (FEOSOL) 325 mg (65 mg iron) Tab tablet TAKE 1 TABLET BY MOUTH ONCE DAILY. 90 tablet 1    hydroCHLOROthiazide (HYDRODIURIL) 25 MG tablet TAKE 1 TABLET BY MOUTH EVERY DAY FOR 90 DAYS 90 tablet 4    metoprolol succinate (TOPROL-XL) 100 MG 24 hr tablet TAKE 1 TABLET BY MOUTH EVERY DAY 90 tablet 4    NIFEdipine (PROCARDIA-XL) 30 MG (OSM) 24 hr tablet TK 1 T PO QD ATN  12    omeprazole (PRILOSEC) 20 MG capsule TAKE 1 CAPSULE BY MOUTH EVERY DAY 90 capsule 4    rosuvastatin (CRESTOR) 10 MG tablet TAKE 1 TABLET BY MOUTH EVERY DAY 90 tablet 4    cefdinir (OMNICEF) 300 MG capsule Take 300 mg by mouth 2 (two) times daily.      cetirizine (ZYRTEC) 10 MG tablet Take 10 mg by mouth.      fluticasone propionate (FLONASE) 50 mcg/actuation nasal spray 2 sprays by Each Nostril route.      methylPREDNISolone (MEDROL DOSEPACK) 4 mg tablet Take by mouth.      montelukast (SINGULAIR) 10 mg tablet Take 10 mg by mouth.       No current facility-administered medications on file prior to visit.     Physical Examination  /77   Pulse 63   Wt 79.3 kg (174 lb 13.2 oz)   BMI 27.38 kg/m²      A chaperone was present for the physical  examination.    Constitutional: well developed, no cough, no dyspnea, alert, and no acute distress    Head: Normocephalic, no lesions, without obvious abnormality  Eye: Normal external eye, conjunctiva, and lids  Cardiovascular: regular rate and regular rhythm  Respiratory: normal air entry  Gastrointestinal: soft, non-tender  Musculoskeletal: full range of motion without pain  Neurologic: alert, oriented, normal speech, no focal findings or movement disorder noted  Psychiatric: appropriate, normal mood    Assessment and Plan of Care    Thank you again for referring Mr. Doss to my care. In summary, Mr. Doss is a 53 year old man presenting with endoscopically resected colon adenocarcinoma - discussed benefits and risks of surgery vs. surveillance at length by phone and today in person. We discussed treatment options and have provided the following recommendations:    Plan for repeat colonoscopic surveillance in 6 months, repeat CT at that time - attention to adrenal and hepatic findings although likely not related  Previously referred to Vascular Surgery for incidentally found aortic abnormality  Encourage smoking cessation - he was called by them  Discussed importance of surveillance     Please do not hesitate to contact me if you have any questions.      Nilson Koch MD, FACS, FASCRS  Department of Colon & Rectal Surgery  [facility]

## 2024-09-19 ENCOUNTER — OFFICE VISIT (OUTPATIENT)
Dept: SURGERY | Facility: CLINIC | Age: 54
End: 2024-09-19
Payer: COMMERCIAL

## 2024-09-19 VITALS
HEART RATE: 63 BPM | WEIGHT: 174.81 LBS | SYSTOLIC BLOOD PRESSURE: 136 MMHG | BODY MASS INDEX: 27.38 KG/M2 | DIASTOLIC BLOOD PRESSURE: 77 MMHG

## 2024-09-19 DIAGNOSIS — C26.9 MALIGNANT NEOPLASM OF ILL-DEFINED SITES WITHIN THE DIGESTIVE SYSTEM: Primary | ICD-10-CM

## 2024-09-19 PROCEDURE — 1159F MED LIST DOCD IN RCRD: CPT | Mod: CPTII,S$GLB,, | Performed by: STUDENT IN AN ORGANIZED HEALTH CARE EDUCATION/TRAINING PROGRAM

## 2024-09-19 PROCEDURE — 99999 PR PBB SHADOW E&M-EST. PATIENT-LVL IV: CPT | Mod: PBBFAC,,, | Performed by: STUDENT IN AN ORGANIZED HEALTH CARE EDUCATION/TRAINING PROGRAM

## 2024-09-19 PROCEDURE — 99212 OFFICE O/P EST SF 10 MIN: CPT | Mod: S$GLB,,, | Performed by: STUDENT IN AN ORGANIZED HEALTH CARE EDUCATION/TRAINING PROGRAM

## 2024-09-19 PROCEDURE — 3044F HG A1C LEVEL LT 7.0%: CPT | Mod: CPTII,S$GLB,, | Performed by: STUDENT IN AN ORGANIZED HEALTH CARE EDUCATION/TRAINING PROGRAM

## 2024-09-19 PROCEDURE — 3075F SYST BP GE 130 - 139MM HG: CPT | Mod: CPTII,S$GLB,, | Performed by: STUDENT IN AN ORGANIZED HEALTH CARE EDUCATION/TRAINING PROGRAM

## 2024-09-19 PROCEDURE — 3078F DIAST BP <80 MM HG: CPT | Mod: CPTII,S$GLB,, | Performed by: STUDENT IN AN ORGANIZED HEALTH CARE EDUCATION/TRAINING PROGRAM

## 2024-09-19 PROCEDURE — 3008F BODY MASS INDEX DOCD: CPT | Mod: CPTII,S$GLB,, | Performed by: STUDENT IN AN ORGANIZED HEALTH CARE EDUCATION/TRAINING PROGRAM

## 2024-09-20 PROBLEM — K80.20 CALCULUS OF GALLBLADDER WITHOUT CHOLECYSTITIS WITHOUT OBSTRUCTION: Status: ACTIVE | Noted: 2024-09-20

## 2024-09-20 PROBLEM — I70.0 ATHEROSCLEROSIS OF ABDOMINAL AORTA: Status: ACTIVE | Noted: 2024-09-20

## 2024-09-20 PROBLEM — D35.00 ADRENAL ADENOMA: Status: ACTIVE | Noted: 2024-09-20

## 2024-09-30 ENCOUNTER — TELEPHONE (OUTPATIENT)
Dept: VASCULAR SURGERY | Facility: CLINIC | Age: 54
End: 2024-09-30
Payer: COMMERCIAL

## 2024-09-30 DIAGNOSIS — I71.40 ABDOMINAL AORTIC ANEURYSM (AAA) WITHOUT RUPTURE, UNSPECIFIED PART: Primary | ICD-10-CM

## 2024-09-30 NOTE — TELEPHONE ENCOUNTER
Contacted pt to notify him that Dr. Tinsley has reviewed his chart and does not feel that AAA ultrasound is needed as CT was performed recently. Notified pt that appt in vascular lab can be cancelled. Pt verbalized understanding, appt cancelled.

## 2024-10-01 ENCOUNTER — INITIAL CONSULT (OUTPATIENT)
Dept: VASCULAR SURGERY | Facility: CLINIC | Age: 54
End: 2024-10-01
Payer: COMMERCIAL

## 2024-10-01 VITALS
HEIGHT: 66 IN | SYSTOLIC BLOOD PRESSURE: 131 MMHG | HEART RATE: 64 BPM | DIASTOLIC BLOOD PRESSURE: 87 MMHG | WEIGHT: 171.94 LBS | TEMPERATURE: 98 F | BODY MASS INDEX: 27.63 KG/M2

## 2024-10-01 DIAGNOSIS — I70.0 ATHEROSCLEROTIC ULCER OF AORTA: ICD-10-CM

## 2024-10-01 DIAGNOSIS — I71.9 ATHEROSCLEROTIC ULCER OF AORTA: ICD-10-CM

## 2024-10-01 DIAGNOSIS — I71.43 INFRARENAL ABDOMINAL AORTIC ANEURYSM (AAA) WITHOUT RUPTURE: Primary | ICD-10-CM

## 2024-10-01 PROCEDURE — 99204 OFFICE O/P NEW MOD 45 MIN: CPT | Mod: S$GLB,,, | Performed by: SURGERY

## 2024-10-01 PROCEDURE — 99999 PR PBB SHADOW E&M-EST. PATIENT-LVL IV: CPT | Mod: PBBFAC,,, | Performed by: SURGERY

## 2024-10-01 NOTE — PROGRESS NOTES
VASCULAR SURGERY SERVICE    REFERRING DOCTOR: Nilson Koch [facility] Provider     CHIEF COMPLAINT:  Thrombosed aortic penetrating ulcer    HISTORY OF PRESENT ILLNESS: Reji Doss is a 53 y.o. 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  He has no history of back or abdominal pain.  He has been a smoker switched to vaping is now using nicotine oral pouches.    Past Medical History:   Diagnosis Date    Dyslipidemia     GERD (gastroesophageal reflux disease)     HTN (hypertension)     Hyperlipidemia     Stroke        Past Surgical History:   Procedure Laterality Date    COLONOSCOPY  07/03/2024    COLONOSCOPY N/A 8/15/2024    Procedure: COLONOSCOPY;  Surgeon: Nilson Koch MD;  Location: [facility];  Service: Colon and Rectal;  Laterality: N/A;    VASECTOMY           Current Outpatient Medications:     ascorbic acid, vitamin C, (VITAMIN C) 500 MG tablet, Take 1 tablet (500 mg total) by mouth once daily., Disp: 30 tablet, Rfl: 3    aspirin (ECOTRIN) 81 MG EC tablet, Take 81 mg by mouth once daily., Disp: , Rfl:     hydroCHLOROthiazide (HYDRODIURIL) 25 MG tablet, TAKE 1 TABLET BY MOUTH EVERY DAY FOR 90 DAYS, Disp: 90 tablet, Rfl: 4    metoprolol succinate (TOPROL-XL) 100 MG 24 hr tablet, TAKE 1 TABLET BY MOUTH EVERY DAY, Disp: 90 tablet, Rfl: 4    NIFEdipine (PROCARDIA-XL) 30 MG (OSM) 24 hr tablet, TK 1 T PO QD ATN, Disp: , Rfl: 12    omeprazole (PRILOSEC) 20 MG capsule, TAKE 1 CAPSULE BY MOUTH EVERY DAY, Disp: 90 capsule, Rfl: 4    rosuvastatin (CRESTOR) 10 MG tablet, TAKE 1 TABLET BY MOUTH EVERY DAY, Disp: 90 tablet, Rfl: 4    Review of patient's allergies indicates:  No Known Allergies    Family History   Problem Relation Name Age of Onset    Hypertension Mother         Social History     Tobacco Use    Smoking status: Former     Current packs/day: 0.25     Average packs/day: 0.3 packs/day for  "15.0 years (3.8 ttl pk-yrs)     Types: Cigarettes, Vaping with nicotine    Smokeless tobacco: Current    Tobacco comments:     Former cigarette smoker; currently vapes with nicotine   Substance Use Topics    Alcohol use: Never     Comment: barely drinks    Drug use: Yes     Types: Marijuana     Comment: once in a while       REVIEW OF SYSTEMS:  General: No chills, fever, malaise, changes in weight  HEENT: No visual changes, difficulty hearing  Cardiovascular: No chest pain, palpitations, claudication  Pulmonary: No dyspnea, cough, wheezing  Gastrointestinal: No nausea, vomiting, diarrhea, constipation  Genitourinary: No dysuria, low urine output, hematuria  Endocrine: No polydipsia, polyphagia  Hematologic: No fatigue with exertion, pica, pallor  Musculoskeletal: No extremity or joint pain, no back pain, no difficulty with ambulation  Neurologic: no seizures, no headaches, no weakness  Psychiatric: no mood disturbance      PHYSICAL EXAM:   /87 (BP Location: Left arm, Patient Position: Sitting)   Pulse 64   Temp 98.2 °F (36.8 °C) (Oral)   Ht 5' 6" (1.676 m)   Wt 78 kg (171 lb 15.3 oz)   BMI 27.75 kg/m²   Constitutional:  Alert,   Well-appearing  In no distress.   Neurological: Normal speech  no focal findings  CN II - XII grossly intact.    Psychiatric: Mood and affect appropriate and symmetric.   HEENT: Normocephalic / atraumatic  PERRLA  Midline trachea  No scars across the neck   Cardiac: Regular rate and rhythm.   Pulmonary: Normal pulmonary effort.   Abdomen: Soft, not distended.  Nontender   Skin: Warm and well perfused.    Vascular:  Femoral pulses 2+   Extremities/  Musculoskeletal: No edema.        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  I have reassured him this poses no significant risk to him at " this size    PLAN:  spent greater than 3 minutes underscored importance of quitting all forms of tobacco  Follow up in 2 years with aortic ultrasound for surveillance      ERIN Tinsley III, MD, FACS  Professor and Chief, Vascular and Endovascular Surgery

## 2024-10-07 ENCOUNTER — CLINICAL SUPPORT (OUTPATIENT)
Dept: SMOKING CESSATION | Facility: CLINIC | Age: 54
End: 2024-10-07

## 2024-10-07 DIAGNOSIS — F17.200 NICOTINE DEPENDENCE: Primary | ICD-10-CM

## 2024-10-07 PROCEDURE — 99999 PR PBB SHADOW E&M-EST. PATIENT-LVL I: CPT | Mod: PBBFAC,,,

## 2024-10-07 RX ORDER — VARENICLINE TARTRATE 1 MG/1
1 TABLET, FILM COATED ORAL 2 TIMES DAILY
Qty: 56 TABLET | Refills: 0 | Status: SHIPPED | OUTPATIENT
Start: 2024-10-07

## 2024-10-28 ENCOUNTER — CLINICAL SUPPORT (OUTPATIENT)
Dept: SMOKING CESSATION | Facility: CLINIC | Age: 54
End: 2024-10-28

## 2024-10-28 DIAGNOSIS — F17.200 NICOTINE DEPENDENCE: Primary | ICD-10-CM

## 2024-10-28 PROCEDURE — 99999 PR PBB SHADOW E&M-EST. PATIENT-LVL I: CPT | Mod: PBBFAC,,,

## 2024-10-28 PROCEDURE — 99403 PREV MED CNSL INDIV APPRX 45: CPT | Mod: ,,,

## 2024-11-11 ENCOUNTER — CLINICAL SUPPORT (OUTPATIENT)
Dept: SMOKING CESSATION | Facility: CLINIC | Age: 54
End: 2024-11-11

## 2024-11-11 DIAGNOSIS — F17.200 NICOTINE DEPENDENCE: Primary | ICD-10-CM

## 2024-11-11 PROCEDURE — 99999 PR PBB SHADOW E&M-EST. PATIENT-LVL I: CPT | Mod: PBBFAC,,,

## 2024-11-11 PROCEDURE — 99403 PREV MED CNSL INDIV APPRX 45: CPT | Mod: ,,,

## 2024-11-11 NOTE — PROGRESS NOTES
Individual Follow-Up Form    11/11/2024    Quit Date: TBD    Clinical Status of Patient: Outpatient    Length of Service: 45 minutes    Continuing Medication: no    Other Medications: none     Target Symptoms: Withdrawal and medication side effects. The following were  rated moderate (3) to severe (4) on TCRS:  Moderate (3): desire/crave nicotine  Severe (4): none    Comments: Patient seen in clinic regarding smoking cessation follow up. Pt reports that he is down to 3 On! Pouches per day. New goal is to attempt a dry run. Explained purpose of dry run practice and discussed strategies with patient. Completion of TCRS (Tobacco Cessation Rating Scale) reviewed strategies, cues, and triggers. Introduced the negative impact of nicotine on health, the health advantages of discontinuing the use of tobacco, time line improved health changes after a quit, withdrawal issues to expect from nicotine and habit, and ways to achieve the goal of a quit. Pt encouraged to explore making changes to his routines surrounding nicotine use. Commended pt for accomplishments thus far. FU in 2 weeks.    Diagnosis: F17.200    Next Visit: 2 weeks

## 2024-11-25 ENCOUNTER — CLINICAL SUPPORT (OUTPATIENT)
Dept: SMOKING CESSATION | Facility: CLINIC | Age: 54
End: 2024-11-25

## 2024-11-25 DIAGNOSIS — F17.200 NICOTINE DEPENDENCE: Primary | ICD-10-CM

## 2024-11-25 PROCEDURE — 99999 PR PBB SHADOW E&M-EST. PATIENT-LVL I: CPT | Mod: PBBFAC,,,

## 2024-11-25 NOTE — PROGRESS NOTES
Individual Follow-Up Form    11/25/2024    Quit Date: TBD (planned next week)    Clinical Status of Patient: Outpatient    Length of Service: 45 minutes    Continuing Medication: no    Other Medications: none     Target Symptoms: Withdrawal and medication side effects. The following were  rated moderate (3) to severe (4) on TCRS:  Moderate (3): desire/crave nicotine  Severe (4): none    Comments: Patient seen in clinic regarding follow up for smoking cessation. Patient is down to 2-3 On! Pouches per day. While pt did not attempt a dry run, he feels that he will make a quit attempt within the next 2 weeks. Discussed preparations.  Completion of TCRS (Tobacco Cessation Rating Scale) reviewed strategies, controlling environment, cues, triggers, new goals set. Introduced high risk situations with preparation interventions, caffeine similarities with withdrawal issues of habit and nicotine, Alcohol (pt does not drink), Understanding urges, cravings, stress and relaxation. Open discussion with intervention discussion. Encouraged pt to remain positive and commended accomplishments thus far. FU in 3 weeks.    Diagnosis: F17.200    Next Visit: 3 weeks

## 2024-12-16 ENCOUNTER — CLINICAL SUPPORT (OUTPATIENT)
Dept: SMOKING CESSATION | Facility: CLINIC | Age: 54
End: 2024-12-16

## 2024-12-16 DIAGNOSIS — F17.200 NICOTINE DEPENDENCE: Primary | ICD-10-CM

## 2024-12-16 PROCEDURE — 99403 PREV MED CNSL INDIV APPRX 45: CPT | Mod: ,,,

## 2024-12-16 PROCEDURE — 99999 PR PBB SHADOW E&M-EST. PATIENT-LVL I: CPT | Mod: PBBFAC,,,

## 2024-12-16 NOTE — PROGRESS NOTES
Individual Follow-Up Form    12/16/2024    Quit Date: 12/4/24    Clinical Status of Patient: Outpatient    Length of Service: 45 minutes    Continuing Medication: no    Other Medications: none     Target Symptoms: Withdrawal and medication side effects. The following were  rated moderate (3) to severe (4) on TCRS:  Moderate (3): desire/crave nicotine  Severe (4): none    Comments: Patient seen in clinic regarding smoking cessation follow up. Pt has been nicotine free since 12/4/24. Congratulated pt for meeting his goal. Completion of TCRS (Tobacco Cessation Rating Scale) reviewed strategies, habitual behavior, stress, and high risk situations. Introduced stress with addition interventions, relaxation with interventions, nutrition, exercise, weight gain, and the importance of rewarding oneself for accomplishments toward becoming tobacco free. Open discussion of all items with interventions. Encouraged pt to remain positive and to focus on controlling environment and recognizing triggers. FU in approximately 3-4 weeks.    Diagnosis: F17.200    Next Visit: 4 weeks

## 2025-01-06 ENCOUNTER — CLINICAL SUPPORT (OUTPATIENT)
Dept: SMOKING CESSATION | Facility: CLINIC | Age: 55
End: 2025-01-06

## 2025-01-06 DIAGNOSIS — F17.200 NICOTINE DEPENDENCE: Primary | ICD-10-CM

## 2025-01-06 PROCEDURE — 99402 PREV MED CNSL INDIV APPRX 30: CPT | Mod: ,,,

## 2025-01-06 PROCEDURE — 99999 PR PBB SHADOW E&M-EST. PATIENT-LVL I: CPT | Mod: PBBFAC,,,

## 2025-01-06 NOTE — PROGRESS NOTES
Individual Follow-Up Form    1/6/2025    Quit Date: 12/4/24    Clinical Status of Patient: Outpatient    Length of Service: 30 minutes    Continuing Medication: no    Other Medications: none     Target Symptoms: Withdrawal and medication side effects. The following were  rated moderate (3) to severe (4) on TCRS:  Moderate (3): desire/crave tobacco  Severe (4): none    Comments: Smoking cessation follow up regarding final progress update. Pt states that while he continues to have cravings, he is able to manage well with the use of healthy coping skills and stress management strategies. Completion of TCRS (Tobacco Cessation Rating Scale) reviewed strategies, cues, triggers, high risk situations, lapses, relapses, diet, exercise, stress, relaxation, sleep, habitual behavior, and life style changes. Congratulated pt for meeting his goal living a tobacco free life. Notifies pt of additional benefits available and future follow ups to expect with case management. Encouraged pt to call should he need any assistance in the future.    Diagnosis: F17.200    Next Visit: 3 months- case management

## 2025-01-17 ENCOUNTER — TELEPHONE (OUTPATIENT)
Dept: SURGERY | Facility: CLINIC | Age: 55
End: 2025-01-17
Payer: COMMERCIAL

## 2025-01-17 DIAGNOSIS — C18.9 COLON ADENOCARCINOMA: Primary | ICD-10-CM

## 2025-01-17 NOTE — TELEPHONE ENCOUNTER
RN called pt to get him scheduled for a CT and labs for surveillance before his next scope per Dr. Koch's request.  Pt is scheduled for 2/28 at [facility].  All instructions and appt details reviewed.  Pt verbalized understanding to all.

## 2025-02-03 ENCOUNTER — CLINICAL SUPPORT (OUTPATIENT)
Dept: SMOKING CESSATION | Facility: CLINIC | Age: 55
End: 2025-02-03

## 2025-02-03 DIAGNOSIS — F17.200 NICOTINE DEPENDENCE: Primary | ICD-10-CM

## 2025-02-03 PROCEDURE — 99999 PR PBB SHADOW E&M-EST. PATIENT-LVL I: CPT | Mod: PBBFAC,,,

## 2025-02-03 PROCEDURE — 99407 BEHAV CHNG SMOKING > 10 MIN: CPT | Mod: ,,,

## 2025-02-03 NOTE — PROGRESS NOTES
Patient triggered a [facility] call back 23 hours ago. Patient's questions already addressed by CTRN. Will remove patient from receiving future [facility] calls and close case.    Spoke with patient today in regard to smoking cessation progress for 3 month telephone follow up, he states tobacco free. Commended patient on the accomplishment thus far. He states using no tobacco cessation medication to aid in his quit. Informed patient of benefit period, future follow ups, and contact information if any further help or support is needed. Will complete smart form for 3 month follow up on Quit attempt #1.

## 2025-02-28 ENCOUNTER — HOSPITAL ENCOUNTER (OUTPATIENT)
Dept: RADIOLOGY | Facility: HOSPITAL | Age: 55
Discharge: HOME | End: 2025-02-28
Attending: STUDENT IN AN ORGANIZED HEALTH CARE EDUCATION/TRAINING PROGRAM
Payer: COMMERCIAL

## 2025-02-28 DIAGNOSIS — C26.9 MALIGNANT NEOPLASM OF ILL-DEFINED SITES WITHIN THE DIGESTIVE SYSTEM: ICD-10-CM

## 2025-02-28 PROCEDURE — 71260 CT THORAX DX C+: CPT | Mod: TC

## 2025-02-28 PROCEDURE — 25500020 PHARM REV CODE 255: Performed by: STUDENT IN AN ORGANIZED HEALTH CARE EDUCATION/TRAINING PROGRAM

## 2025-02-28 RX ADMIN — IOHEXOL 100 ML: 350 INJECTION, SOLUTION INTRAVENOUS at 08:02

## 2025-03-04 ENCOUNTER — RESULTS FOLLOW-UP (OUTPATIENT)
Dept: SURGERY | Facility: CLINIC | Age: 55
End: 2025-03-04

## 2025-03-04 ENCOUNTER — PATIENT MESSAGE (OUTPATIENT)
Dept: SURGERY | Facility: HOSPITAL | Age: 55
End: 2025-03-04
Payer: COMMERCIAL

## 2025-03-19 ENCOUNTER — HOSPITAL ENCOUNTER (OUTPATIENT)
Dept: PREADMISSION TESTING | Facility: HOSPITAL | Age: 55
Discharge: HOME | End: 2025-03-19
Attending: STUDENT IN AN ORGANIZED HEALTH CARE EDUCATION/TRAINING PROGRAM
Payer: COMMERCIAL

## 2025-03-19 NOTE — DISCHARGE INSTRUCTIONS
Colonoscopy Prep Instructions      Date: Thursday 4/03/25  Arrival time:         IMPORTANT: PLEASE READ YOUR INSTRUCTIONS CAREFULLY. FAILURE TO FOLLOW THESE INSTRUCTIONS MAY RESULT IN YOUR PROCEURE BEING CANCELED,RESCHEDULED, OR REPEATED.    Starting one week out on the Thursday before your colonoscopy: (03/27/25)--start eating a low/no fiber diet.  Avoid nuts, corn, seeded veggies, beans, raw veggies, and meats that are harder to digest.  Poultry and fish are preferable to beef and pork.  Make sure your veggies are cooked well.  Breads/rice/noodles--all white is better.  MAKE SURE YOU ARE HYDRATING WELL!.         Two days before your procedure:  Tuesday (04/01/25)  Your diet does not change this day, but make sure that if you have not increased your water intake that you do so today at least.  On Tuesday afternoon/evening, take the 4 Dulcolax laxatives, follow with a bottle of water. Typically, after 2-3 hours, you will have 1-2 large bowel movements.          The day before your procedure: Wednesday (04/02/25)  Upon awakening begin the clear liquid diet. DO NOT EAT SOLID FOODS     Clear Liquid Diet---- you may have any of the following:     Water, tea, coffee (decaffeinated or regular)     Soft drinks (regular or sugar free)     Gelatin dessert (plain or flavored)     Juice, Gatorade, Powerade, Crystal Lite, lemonade, limeade, Justice-Aid     Bouillon, clear consommé, 100% fat free beef, chicken, or vegetable broths     Snowballs, popsicles     100% cranberry juice is the only red liquid allowed     Please Avoid the following:     Anything with RED dye     Liquids not specifically listed     Dairy (liquid and powder)     Creamers (liquid and powder)     Alcohol       Drink at least 6-8 glasses of clear liquids until you begin your prep           At 12:00 pm-- Pour one 6-ounce bottle of Suprep into the mixing container                          Add water to the 16-ounce line on the container and mix                 Drink all the mixed prep plus 2 more 16-ounce containers of clear liquid within 3 hours    It is common to experience abdominal cramping, nausea, and vomiting when taking the prep. If you have nausea and/or vomiting while taking the prep, stop drinking for 20-30 minutes then resume.      You may continue with the clear liquids after this step.         THERE MUST BE A MINIMUM OF A 2 HOUR WINDOW BETWEEN 1ST AND 2ND DOSE OF MEDICATION    At 5:00 pm:         Pour one 6-ounce bottle of Suprep into the mixing container                               Add water to the 16-ounce line on the container and mix                                Drink all the mixed prep plus 2 more 16-ounce containers of clear liquid within 3 hours    Once your prep is complete, you can continue to have clear liquids through the night until 2 hours before your arrival on Thursday at ___________________am    Before arrival, with a sip of water, take _____________________________    YOU MUST HAVE A  HOME!

## 2025-04-03 ENCOUNTER — ANESTHESIA (OUTPATIENT)
Dept: ENDOSCOPY | Facility: HOSPITAL | Age: 55
End: 2025-04-03
Payer: COMMERCIAL

## 2025-04-03 ENCOUNTER — ANESTHESIA EVENT (OUTPATIENT)
Dept: ENDOSCOPY | Facility: HOSPITAL | Age: 55
End: 2025-04-03
Payer: COMMERCIAL

## 2025-04-03 ENCOUNTER — HOSPITAL ENCOUNTER (OUTPATIENT)
Dept: ENDOSCOPY | Facility: HOSPITAL | Age: 55
Discharge: HOME | End: 2025-04-03
Attending: STUDENT IN AN ORGANIZED HEALTH CARE EDUCATION/TRAINING PROGRAM
Payer: COMMERCIAL

## 2025-04-03 VITALS
OXYGEN SATURATION: 99 % | DIASTOLIC BLOOD PRESSURE: 81 MMHG | TEMPERATURE: 97 F | RESPIRATION RATE: 18 BRPM | HEART RATE: 60 BPM | SYSTOLIC BLOOD PRESSURE: 174 MMHG

## 2025-04-03 DIAGNOSIS — C26.9 MALIGNANT NEOPLASM OF ILL-DEFINED SITES WITHIN THE DIGESTIVE SYSTEM: ICD-10-CM

## 2025-04-03 PROCEDURE — 37000008 HC ANESTHESIA 1ST 15 MINUTES

## 2025-04-03 PROCEDURE — 27201089 HC SNARE, DISP (ANY): Performed by: STUDENT IN AN ORGANIZED HEALTH CARE EDUCATION/TRAINING PROGRAM

## 2025-04-03 PROCEDURE — 27201012 HC FORCEPS, HOT/COLD, DISP: Performed by: STUDENT IN AN ORGANIZED HEALTH CARE EDUCATION/TRAINING PROGRAM

## 2025-04-03 PROCEDURE — 37000009 HC ANESTHESIA EA ADD 15 MINS

## 2025-04-03 PROCEDURE — 63600175 PHARM REV CODE 636 W HCPCS: Performed by: NURSE ANESTHETIST, CERTIFIED REGISTERED

## 2025-04-03 RX ORDER — PROPOFOL 10 MG/ML
VIAL (ML) INTRAVENOUS
Status: DISCONTINUED | OUTPATIENT
Start: 2025-04-03 | End: 2025-04-03

## 2025-04-03 RX ORDER — LIDOCAINE HYDROCHLORIDE 20 MG/ML
INJECTION, SOLUTION EPIDURAL; INFILTRATION; INTRACAUDAL; PERINEURAL
Status: DISCONTINUED | OUTPATIENT
Start: 2025-04-03 | End: 2025-04-03

## 2025-04-03 RX ADMIN — SODIUM CHLORIDE, SODIUM LACTATE, POTASSIUM CHLORIDE, AND CALCIUM CHLORIDE: .6; .31; .03; .02 INJECTION, SOLUTION INTRAVENOUS at 12:04

## 2025-04-03 RX ADMIN — PROPOFOL 50 MG: 10 INJECTION, EMULSION INTRAVENOUS at 12:04

## 2025-04-03 RX ADMIN — PROPOFOL 75 MG: 10 INJECTION, EMULSION INTRAVENOUS at 12:04

## 2025-04-03 RX ADMIN — PROPOFOL 100 MG: 10 INJECTION, EMULSION INTRAVENOUS at 12:04

## 2025-04-03 RX ADMIN — LIDOCAINE HYDROCHLORIDE 100 MG: 20 INJECTION, SOLUTION EPIDURAL; INFILTRATION; INTRACAUDAL; PERINEURAL at 12:04

## 2025-04-03 NOTE — ANESTHESIA PREPROCEDURE EVALUATION
04/03/2025  Reji Doss is a 54 y.o., male.  Past Medical History:   Diagnosis Date    Dyslipidemia     GERD (gastroesophageal reflux disease)     HTN (hypertension)     Hyperlipidemia     Stroke      Past Surgical History:   Procedure Laterality Date    COLONOSCOPY  07/03/2024    COLONOSCOPY N/A 8/15/2024    Procedure: COLONOSCOPY;  Surgeon: Nilson Koch MD;  Location: [facility];  Service: Colon and Rectal;  Laterality: N/A;    VASECTOMY           Pre-op Assessment    I have reviewed the Patient Summary Reports.     I have reviewed the Nursing Notes. I have reviewed the NPO Status.   I have reviewed the Medications.     Review of Systems  Anesthesia Hx:  No problems with previous Anesthesia   History of prior surgery of interest to airway management or planning:            Denies Personal Hx of Anesthesia complications.                    Social:  No Alcohol Use, Former Smoker       Hematology/Oncology:  Hematology Normal   Oncology Normal                                   EENT/Dental:  EENT/Dental Normal           Cardiovascular:  Exercise tolerance: good   Hypertension, well controlled              ECG has been reviewed.                            Pulmonary:  Pulmonary Normal                       Renal/:  Renal/ Normal                 Hepatic/GI:     GERD                Musculoskeletal:  Musculoskeletal Normal                Neurological:   CVA Neuromuscular Disease,                                   Endocrine:  Endocrine Normal            Dermatological:  Skin Normal    Psych:  Psychiatric Normal                    Physical Exam  General: Well nourished    Airway:  Mallampati: II   Mouth Opening: Normal  TM Distance: Normal  Tongue: Normal  Neck ROM: Normal ROM    Chest/Lungs:  Clear to auscultation    Heart:  Rate: Normal  Rhythm: Regular Rhythm  Sounds: Normal        Anesthesia  Plan  Type of Anesthesia, risks & benefits discussed:    Anesthesia Type: Gen Natural Airway  Intra-op Monitoring Plan: Standard ASA Monitors  Post Op Pain Control Plan: multimodal analgesia  Induction:  IV  Informed Consent: Informed consent signed with the Patient and all parties understand the risks and agree with anesthesia plan.  All questions answered. Patient consented to blood products? Yes  ASA Score: 3  Day of Surgery Review of History & Physical: H&P Update referred to the surgeon/provider.I have interviewed and examined the patient. I have reviewed the patient's H&P dated: 4/3/25. There are no significant changes.     Ready For Surgery From Anesthesia Perspective.     .

## 2025-04-03 NOTE — ANESTHESIA POSTPROCEDURE EVALUATION
Anesthesia Post Evaluation    Patient: Reji Doss    Procedure(s) Performed: * No procedures listed *    Final Anesthesia Type: general      Patient location during evaluation: PACU  Patient participation: Yes- Able to Participate  Level of consciousness: awake and alert, oriented and awake  Post-procedure vital signs: reviewed and stable  Pain management: adequate  Airway patency: patent    PONV status at discharge: No PONV  Anesthetic complications: no      Cardiovascular status: blood pressure returned to baseline, hemodynamically stable and stable  Respiratory status: unassisted, spontaneous ventilation and room air  Hydration status: euvolemic  Follow-up not needed.              Vitals Value Taken Time   /81 04/03/25 13:30   Temp 36.3 °C (97.3 °F) 04/03/25 13:00   Pulse 60 04/03/25 13:30   Resp 18 04/03/25 13:30   SpO2 99 % 04/03/25 13:30         No case tracking events are documented in the log.      Pain/Adolfo Score: Adolfo Score: 10 (4/3/2025  1:21 PM)

## 2025-04-03 NOTE — TRANSFER OF CARE
Anesthesia Transfer of Care Note    Patient: Reji Doss    Procedure(s) Performed: * No procedures listed *    Patient location: PACU    Anesthesia Type: general    Transport from OR: Transported from OR on 6-10 L/min O2 by face mask with adequate spontaneous ventilation    Post pain: adequate analgesia    Post assessment: no apparent anesthetic complications and tolerated procedure well    Post vital signs: stable    Level of consciousness: sedated    Nausea/Vomiting: no nausea/vomiting    Complications: none    Transfer of care protocol was followed      Last vitals: Visit Vitals  BP (!) 176/86 (BP Location: Left arm, Patient Position: Lying)   Pulse 61   Resp 16   SpO2 98%

## 2025-04-08 ENCOUNTER — RESULTS FOLLOW-UP (OUTPATIENT)
Dept: SURGERY | Facility: CLINIC | Age: 55
End: 2025-04-08

## 2025-04-08 DIAGNOSIS — C26.9 MALIGNANT NEOPLASM OF ILL-DEFINED SITES WITHIN THE DIGESTIVE SYSTEM: Primary | ICD-10-CM

## 2025-04-14 ENCOUNTER — CLINICAL SUPPORT (OUTPATIENT)
Dept: SMOKING CESSATION | Facility: CLINIC | Age: 55
End: 2025-04-14

## 2025-04-14 DIAGNOSIS — F17.200 NICOTINE DEPENDENCE: Primary | ICD-10-CM

## 2025-04-14 PROCEDURE — 99999 PR PBB SHADOW E&M-EST. PATIENT-LVL I: CPT | Mod: PBBFAC,,,

## 2025-04-14 PROCEDURE — 99406 BEHAV CHNG SMOKING 3-10 MIN: CPT | Mod: ,,,

## 2025-04-14 NOTE — PROGRESS NOTES
Spoke with patient today in regard to smoking cessation progress for 6-month telephone follow up.  Patient states that he is tobacco and Nicotine free.  Commended patient on their quit.  Informed patient of benefit period, future follow up, and contact information if any further help or support is needed. Will complete smart form for 6 month follow up on Quit attempt #1.